# Patient Record
Sex: FEMALE
[De-identification: names, ages, dates, MRNs, and addresses within clinical notes are randomized per-mention and may not be internally consistent; named-entity substitution may affect disease eponyms.]

---

## 2017-12-29 ENCOUNTER — HOSPITAL ENCOUNTER (EMERGENCY)
Dept: HOSPITAL 31 - C.ER | Age: 57
Discharge: HOME | End: 2017-12-29
Payer: SELF-PAY

## 2017-12-29 VITALS
DIASTOLIC BLOOD PRESSURE: 102 MMHG | TEMPERATURE: 98 F | OXYGEN SATURATION: 97 % | SYSTOLIC BLOOD PRESSURE: 147 MMHG | RESPIRATION RATE: 20 BRPM | HEART RATE: 85 BPM
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
## 2017-12-29 DIAGNOSIS — S20.211A: Primary | ICD-10-CM
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
## 2017-12-29 NOTE — C.PDOC
History Of Present Illness


57 year old female presents to the ER complaining of pain to the right chest 

wall and upper back after sustaining a fall yesterday. Patient reports that she 

was standing on a step ladder cleaning a shelf and she missed a step as she was 

coming down the ladder.  She states that she hit the right side of her chest 

when she fell. She states that the pain is worse with movement or deep 

breathing. She reports that she took Tylenol which provided mild relief.


Time Seen by Provider: 12/29/17 21:24


Chief Complaint (Nursing): Back Pain


History Per: Patient


History/Exam Limitations: no limitations


Onset/Duration Of Symptoms: Days


Current Symptoms Are (Timing): Still Present


Severity: Moderate





Past Medical History


Reviewed: Historical Data, Nursing Documentation, Vital Signs


Vital Signs: 


 Last Vital Signs











Temp  98 F   12/29/17 21:11


 


Pulse  85   12/29/17 21:11


 


Resp  20   12/29/17 21:11


 


BP  147/102 H  12/29/17 21:11


 


Pulse Ox  97   12/29/17 21:59














- Medical History


PMH: Back Problems


Surgical History: No Surg Hx


Family History: States: No Known Family Hx





- Social History


Hx Tobacco Use: Yes


Hx Alcohol Use: No


Hx Substance Use: No





- Immunization History


Hx Tetanus Toxoid Vaccination: No


Hx Influenza Vaccination: No


Hx Pneumococcal Vaccination: No





Review Of Systems


Cardiovascular: Positive for: Chest Pain (pain in the right chest wall)


Musculoskeletal: Positive for: Back Pain (upper back pain)


Neurological: Negative for: Weakness, Numbness





Physical Exam





- Physical Exam


Appears: Non-toxic, No Acute Distress


Skin: Normal Color, Warm


Head: Atraumatic, Normacephalic


Eye(s): bilateral: Normal Inspection


Ear(s): Bilateral: Normal


Nose: Normal


Neck: Supple


Chest: Tenderness (tenderness to right lateral chest wall ), Ecchymosis (mild 

ecchymosis), Other (no swelling)


Cardiovascular: Rhythm Regular


Respiratory: Normal Breath Sounds, No Accessory Muscle Use, No Rales, No Rhonchi

, No Wheezing


Back: Normal Inspection, No CVA Tenderness


Neurological/Psych: Oriented x3, Normal Speech, Normal Cognition, Normal Motor, 

Normal Sensation





ED Course And Treatment


O2 Sat by Pulse Oximetry: 97





Disposition


Counseled Patient/Family Regarding: Diagnosis, Need For Followup, Rx Given





- Disposition


Disposition: HOME/ ROUTINE


Disposition Time: 22:36


Condition: STABLE


Additional Instructions: 


Your xray was normal, no fracture. Please apply ice to area 15 minutes three 

times a day. Take Motrin as needed for pain every 6 hours, with food to not 

upset stomach. Take Tramadol for severe pain. Follow up with your doctor or 

return to ER if pain persists over one week. 


Prescriptions: 


traMADol [Ultram] 50 mg PO Q8 #20 tab


Instructions:  Rib Contusion (ED)


Forms:  [facility] (English)


Print Language: Danish





- POA


Present On Arrival: None





- Clinical Impression


Clinical Impression: 


 Contusion of chest wall








- PA / NP / Resident Statement


MD/DO has reviewed & agrees with the documentation as recorded.





- Scribe Statement


The provider has reviewed the documentation as recorded by the Eli Judge





Provider Attestation





All medical record entries made by the Eileenibe were at my direction and 

personally dictated by me. I have reviewed the chart and agree that the record 

accurately reflects my personal performance of the history, physical exam, 

medical decision making, and the department course for this patient. I have 

also personally directed, reviewed, and agree with the discharge instructions 

and disposition.

## 2017-12-30 NOTE — RAD
PROCEDURE:  Radiographs of the Chest and Right Ribs.



HISTORY:

pain s.p fall



COMPARISON:

None available. 



TECHNIQUE:

Frontal radiograph of the chest and multiple oblique radiographs of 

the right ribs were obtained.



FINDINGS:



RIGHT RIBS:

No fracture or focal lesion visualized.



LUNGS:

No evidence of acute pulmonary disease



PLEURA:

No pneumothorax or pleural fluid.



CARDIOVASCULAR:

Normal sized heart. No pulmonary vascular congestion.



OTHER FINDINGS:

None.



IMPRESSION:

No radiographic evidence of acute displaced fracture at the right 

ribs.

## 2019-02-18 ENCOUNTER — HOSPITAL ENCOUNTER (EMERGENCY)
Dept: HOSPITAL 31 - C.ER | Age: 59
Discharge: HOME | End: 2019-02-18
Payer: MEDICAID

## 2019-02-18 VITALS
RESPIRATION RATE: 16 BRPM | SYSTOLIC BLOOD PRESSURE: 148 MMHG | DIASTOLIC BLOOD PRESSURE: 90 MMHG | HEART RATE: 84 BPM | TEMPERATURE: 97.5 F | OXYGEN SATURATION: 98 %

## 2019-02-18 DIAGNOSIS — M25.562: Primary | ICD-10-CM

## 2019-02-18 NOTE — RAD
PROCEDURE:  Left Knee Radiographs.



HISTORY:

pain



COMPARISON:

None available



FINDINGS:



BONES:

No acute displaced fracture. 



JOINTS:

No dislocation. 



JOINT EFFUSION:

Probable small joint effusion. 



OTHER FINDINGS:

None.



IMPRESSION:

Probable small suprapatellar joint effusion. 



No acute displaced fracture or dislocation identified. 



If symptoms persist, or if there is continued clinical concern, x-ray 

follow-up in 7-10 days should be considered.

## 2019-02-18 NOTE — C.PDOC
History Of Present Illness


60 y/o female presents to the ED complaining of left knee pain for 2 weeks. 

Denies recent injury or fall. Pain worsens when standing. Patient has tried 

wearing a knee brace with some relief. She reports taking ibuprofen on and off, 

last dose was 2 days ago. Patient adds that she sits for prolonged periods at 

her job, and questions whether pain may be attributed to her sedentary job vs. 

being overweight.





Time Seen by Provider: 02/18/19 11:50


Chief Complaint (Nursing): Lower Extremity Problem/Injury


History Per: Patient


History/Exam Limitations: no limitations


Onset/Duration Of Symptoms: Days


Current Symptoms Are (Timing): Still Present





Past Medical History


Reviewed: Historical Data, Nursing Documentation, Vital Signs


Vital Signs: 





                                Last Vital Signs











Temp  97.5 F L  02/18/19 11:40


 


Pulse  84   02/18/19 11:40


 


Resp  16   02/18/19 11:40


 


BP  148/90   02/18/19 11:40


 


Pulse Ox  98   02/18/19 11:40














- Medical History


PMH: Back Problems


Surgical History: No Surg Hx


Family History: States: No Known Family Hx





- Social History


Hx Tobacco Use: Yes


Hx Alcohol Use: No


Hx Substance Use: No





- Immunization History


Hx Tetanus Toxoid Vaccination: No


Hx Influenza Vaccination: No


Hx Pneumococcal Vaccination: No





Review Of Systems


Constitutional: Negative for: Fever, Chills, Weakness


Cardiovascular: Negative for: Chest Pain


Respiratory: Negative for: Cough, Shortness of Breath


Gastrointestinal: Negative for: Nausea, Vomiting, Diarrhea


Musculoskeletal: Positive for: Leg Pain (Left knee)


Skin: Negative for: Rash


Neurological: Negative for: Weakness, Numbness, Incoordination





Physical Exam





- Physical Exam


Appears: Well, Non-toxic, No Acute Distress


Skin: Normal Color, Warm, No Rash


Head: Atraumatic, Normacephalic


Eye(s): bilateral: Normal Inspection (no scleral icterus), PERRL, EOMI


Oral Mucosa: Moist


Neck: Normal ROM, Supple


Chest: Symmetrical


Respiratory: No Accessory Muscle Use, Other (Normal inspiratory effort)


Extremity: Tenderness (over the medial/distal aspect of left knee), No Calf 

Tenderness, No Swelling (to lower extremities), Other (No effusion, bulge, or 

joint laxity;  No erythema or obvious rash)


Pulses: Left Dorsalis Pedis: Normal (2+), Right Dorsalis Pedis: Normal (2+)


Neurological/Psych: Oriented x3, Normal Cranial Nerves, Normal Motor, Normal 

Sensation





ED Course And Treatment


O2 Sat by Pulse Oximetry: 98 (RA)


Pulse Ox Interpretation: Normal





Medical Decision Making


Medical Decision Making: 





Impression: Atraumatic knee pain





Initial Plan:


- 600 mg PO Motrin


- Left knee x-ray











Disposition


Counseled Patient/Family Regarding: Studies Performed, Diagnosis, Need For 

Followup, Rx Given





- Disposition


Referrals: 


Vance Glynn III, MD [Staff Provider] - 


Disposition: HOME/ ROUTINE


Disposition Time: 12:46


Condition: STABLE


Prescriptions: 


traMADol [Ultram] 50 mg PO TID PRN #15 tab


 PRN Reason: Pain, Severe (8-10)


Instructions:  Knee Pain (DC)


Forms:  [facility] (English), General Discharge Instructions, Work Excuse


Print Language: Niuean





- Clinical Impression


Clinical Impression: 


 Knee pain, left








- PA / NP / Resident Statement


MD/DO has reviewed & agrees with the documentation as recorded.





- Scribe Statement


The provider has reviewed the documentation as recorded by the Scribhollie Bundy





All medical record entries made by the Scribe were at my direction and 

personally dictated by me. I have reviewed the chart and agree that the record 

accurately reflects my personal performance of the history, physical exam, 

medical decision making, and the department course for this patient. I have also

 personally directed, reviewed, and agree with the discharge instructions and 

disposition.